# Patient Record
Sex: FEMALE | Race: WHITE | NOT HISPANIC OR LATINO | Employment: UNEMPLOYED | URBAN - METROPOLITAN AREA
[De-identification: names, ages, dates, MRNs, and addresses within clinical notes are randomized per-mention and may not be internally consistent; named-entity substitution may affect disease eponyms.]

---

## 2017-01-11 ENCOUNTER — OFFICE VISIT (OUTPATIENT)
Dept: INTERNAL MEDICINE | Facility: CLINIC | Age: 47
End: 2017-01-11

## 2017-01-11 VITALS
HEART RATE: 86 BPM | RESPIRATION RATE: 16 BRPM | BODY MASS INDEX: 24.53 KG/M2 | WEIGHT: 152 LBS | DIASTOLIC BLOOD PRESSURE: 72 MMHG | SYSTOLIC BLOOD PRESSURE: 100 MMHG

## 2017-01-11 DIAGNOSIS — F32.A DEPRESSION, UNSPECIFIED DEPRESSION TYPE: ICD-10-CM

## 2017-01-11 DIAGNOSIS — G43.909 MIGRAINE WITHOUT STATUS MIGRAINOSUS, NOT INTRACTABLE, UNSPECIFIED MIGRAINE TYPE: Primary | ICD-10-CM

## 2017-01-11 PROCEDURE — 99214 OFFICE O/P EST MOD 30 MIN: CPT | Performed by: INTERNAL MEDICINE

## 2017-01-11 RX ORDER — SERTRALINE HYDROCHLORIDE 100 MG/1
TABLET, FILM COATED ORAL
Qty: 30 TABLET | Refills: 11 | Status: SHIPPED | OUTPATIENT
Start: 2017-01-11 | End: 2018-01-25

## 2017-01-11 RX ORDER — TOPIRAMATE 50 MG/1
50 TABLET, FILM COATED ORAL 2 TIMES DAILY
Qty: 30 TABLET | Refills: 11 | Status: SHIPPED | OUTPATIENT
Start: 2017-01-11 | End: 2018-01-15

## 2017-01-11 RX ORDER — AMITRIPTYLINE HYDROCHLORIDE 25 MG/1
25 TABLET, FILM COATED ORAL NIGHTLY PRN
Qty: 30 TABLET | Refills: 11 | Status: SHIPPED | OUTPATIENT
Start: 2017-01-11 | End: 2018-01-15

## 2017-01-11 RX ORDER — SUMATRIPTAN 25 MG/1
TABLET, FILM COATED ORAL
Qty: 10 TABLET | Refills: 5 | Status: SHIPPED | OUTPATIENT
Start: 2017-01-11 | End: 2018-08-07

## 2017-01-11 NOTE — MR AVS SNAPSHOT
Aga Campos   1/11/2017 9:30 AM   Office Visit    Provider:  Courtney Nixon DO   Department:  [facility] INTERNAL MEDICINE AND PEDIATRICS   Dept Phone:  [phone]                Your Full Care Plan              Today's Medication Changes          These changes are accurate as of: 1/11/17 10:07 AM.  If you have any questions, ask your nurse or doctor.               Medication(s)that have changed:     amitriptyline 25 MG tablet   Commonly known as:  ELAVIL   Take 1 tablet by mouth At Night As Needed for sleep.   What changed:  See the new instructions.   Changed by:  Courtney Nixon DO       sertraline 100 MG tablet   Commonly known as:  ZOLOFT   Take 1 tablet PO daily.   What changed:  See the new instructions.   Changed by:  Courtney Nixon DO       SUMAtriptan 25 MG tablet   Commonly known as:  IMITREX   Take one tablet at onset of headache; may repeat one in 2 hours PRN mag of 200 mg a day   What changed:  See the new instructions.   Changed by:  Courtney Nixon DO       topiramate 50 MG tablet   Commonly known as:  TOPAMAX   Take 1 tablet by mouth 2 (Two) Times a Day.   What changed:  Another medication with the same name was removed. Continue taking this medication, and follow the directions you see here.   Changed by:  Courtney Nixon DO            Where to Get Your Medications      [facility] [facility] - [phone]  - [phone]   [address] [facility] [address]     Phone:  [phone]     amitriptyline 25 MG tablet    sertraline 100 MG tablet    SUMAtriptan 25 MG tablet    topiramate 50 MG tablet                  Your Updated Medication List          This list is accurate as of: 1/11/17 10:07 AM.  Always use your most recent med list.                amitriptyline 25 MG tablet   Commonly known as:  ELAVIL   Take 1  tablet by mouth At Night As Needed for sleep.       sertraline 100 MG tablet   Commonly known as:  ZOLOFT   Take 1 tablet PO daily.       SUMAtriptan 25 MG tablet   Commonly known as:  IMITREX   Take one tablet at onset of headache; may repeat one in 2 hours PRN mag of 200 mg a day       topiramate 50 MG tablet   Commonly known as:  TOPAMAX   Take 1 tablet by mouth 2 (Two) Times a Day.               You Were Diagnosed With        Codes Comments    Migraine without status migrainosus, not intractable, unspecified migraine type    -  Primary ICD-10-CM: G43.909  ICD-9-CM: 346.90     Depression, unspecified depression type     ICD-10-CM: F32.9  ICD-9-CM: 311       Instructions     None    Patient Instructions History      [employer] Signup     [facility] [email or web address] allows you to send messages to your doctor, view your test results, renew your prescriptions, schedule appointments, and more. To sign up, go to [employer] and click on the Sign Up Now link in the New User? box. Enter your [email or web address] Activation Code exactly as it appears below along with the last four digits of your Social Security Number and your Date of Birth () to complete the sign-up process. If you do not sign up before the expiration date, you must request a new code.    [email or web address] Activation Code: [email or web address]  Expires: 2017 10:06 AM    If you have questions, you can email [email or web address] or call [phone] to talk to our [email or web address] staff. Remember, [email or web address] is NOT to be used for urgent needs. For medical emergencies, dial 911.               Other Info from Your Visit           Your Appointments     2018  8:45 AM EST   Physical with Courtney Nixon,    [facility] INTERNAL MEDICINE AND PEDIATRICS (--)    [address] [address] [phone]           Arrive 15 minutes prior to appointment.              Allergies     Erythromycin        Reason for Visit      Migraine Follow up      Vital Signs     Blood Pressure Pulse Respirations Weight Body Mass Index Smoking Status    100/72 (BP Location: Left arm, Patient Position: Sitting) 86 16 152 lb (68.9 kg) 24.53 kg/m2 Never Smoker      Problems and Diagnoses Noted     Depression    Migraines      No Longer an Issue     Increased frequency of urination    Retention of urine

## 2017-01-11 NOTE — PROGRESS NOTES
Subjective   Aga Campos is a 46 y.o. female Pt is here to follow up on migraines and depression.        .     History of Present Illness   Pt is here to follow up on migraines and depression. She states both issues are doing well without any acute complaints.                 The following portions of the patient's history were reviewed and updated as appropriate: allergies, current medications, past family history, past medical history, past social history, past surgical history and problem list.             Review of Systems   Constitutional: Negative.    HENT: Negative.    Eyes: Negative.    Respiratory: Negative.    Cardiovascular: Negative.    Gastrointestinal: Negative.    Endocrine: Negative.    Genitourinary: Negative.    Musculoskeletal: Negative.    Skin: Negative.    Allergic/Immunologic: Negative.    Neurological:        See HPI.    Hematological: Negative.    Psychiatric/Behavioral:        See HPI.               Objective   Physical Exam   Constitutional: She is oriented to person, place, and time. She appears well-developed and well-nourished.   HENT:   Head: Normocephalic and atraumatic.   Right Ear: External ear normal.   Left Ear: External ear normal.   Nose: Nose normal.   Mouth/Throat: Oropharynx is clear and moist.   Eyes: Conjunctivae and EOM are normal. Pupils are equal, round, and reactive to light.   Neck: Normal range of motion. Neck supple. No tracheal deviation present. No thyromegaly present.   Cardiovascular: Normal rate, regular rhythm, normal heart sounds and intact distal pulses.    Pulmonary/Chest: Effort normal and breath sounds normal.   Abdominal: Soft. Bowel sounds are normal. She exhibits no distension. There is no tenderness.   Neurological: She is alert and oriented to person, place, and time. She has normal reflexes.   Skin: Skin is warm and dry.   Psychiatric: She has a normal mood and affect.   Nursing note and vitals reviewed.              Assessment/Plan   There are  no diagnoses linked to this encounter.  Aga was seen today for migraine.    Diagnoses and all orders for this visit:    Migraine without status migrainosus, not intractable, unspecified migraine type  -     amitriptyline (ELAVIL) 25 MG tablet; Take 1 tablet by mouth At Night As Needed for sleep.  -     topiramate (TOPAMAX) 50 MG tablet; Take 1 tablet by mouth 2 (Two) Times a Day.  -     SUMAtriptan (IMITREX) 25 MG tablet; Take one tablet at onset of headache; may repeat one in 2 hours PRN mag of 200 mg a day    Depression, unspecified depression type    Other orders  -     sertraline (ZOLOFT) 100 MG tablet; Take 1 tablet PO daily.          1.) Pt has had flu vaccine this year. December 2016.   2.) Follow up in 1 year or sooner if needed.  3.) Reviewed and refilled chronic meds if needed.      * Total time spent in discussion and exam 25 minutes.

## 2017-01-25 ENCOUNTER — TELEPHONE (OUTPATIENT)
Dept: INTERNAL MEDICINE | Facility: CLINIC | Age: 47
End: 2017-01-25

## 2017-01-25 RX ORDER — GUAIFENESIN, PSEUDOEPHEDRINE HYDROCHLORIDE 600; 60 MG/1; MG/1
1 TABLET, EXTENDED RELEASE ORAL EVERY 12 HOURS
Qty: 30 TABLET | Refills: 2 | Status: SHIPPED | OUTPATIENT
Start: 2017-01-25 | End: 2017-06-22 | Stop reason: SDUPTHER

## 2017-01-25 NOTE — TELEPHONE ENCOUNTER
----- Message from Yudy Gomez MA sent at 1/25/2017  2:25 PM EST -----  Spoke with Pt and she states she takes Mucinex-D daily and meant to get a rx for this. Pharmacy will only let her buy it otc for prn.     [phone]  Jose E

## 2017-06-26 RX ORDER — GUAIFENESIN AND PSEUDOEPHEDRINE HYDROCHLORIDE 600; 60 MG/1; MG/1
TABLET, EXTENDED RELEASE ORAL
Qty: 30 TABLET | Refills: 1 | Status: SHIPPED | OUTPATIENT
Start: 2017-06-26 | End: 2017-08-25 | Stop reason: SDUPTHER

## 2017-07-27 ENCOUNTER — TELEPHONE (OUTPATIENT)
Dept: INTERNAL MEDICINE | Facility: CLINIC | Age: 47
End: 2017-07-27

## 2017-08-25 RX ORDER — GUAIFENESIN, PSEUDOEPHEDRINE HYDROCHLORIDE 600; 60 MG/1; MG/1
1 TABLET, EXTENDED RELEASE ORAL EVERY 12 HOURS
Qty: 30 TABLET | Refills: 1 | Status: SHIPPED | OUTPATIENT
Start: 2017-08-25 | End: 2017-09-28 | Stop reason: SDUPTHER

## 2017-09-29 RX ORDER — GUAIFENESIN, PSEUDOEPHEDRINE HYDROCHLORIDE 600; 60 MG/1; MG/1
TABLET, EXTENDED RELEASE ORAL
Qty: 30 TABLET | Refills: 0 | Status: SHIPPED | OUTPATIENT
Start: 2017-09-29 | End: 2018-08-07 | Stop reason: SDUPTHER

## 2018-01-15 ENCOUNTER — OFFICE VISIT (OUTPATIENT)
Dept: INTERNAL MEDICINE | Facility: CLINIC | Age: 48
End: 2018-01-15

## 2018-01-15 VITALS
RESPIRATION RATE: 16 BRPM | OXYGEN SATURATION: 100 % | BODY MASS INDEX: 24.04 KG/M2 | HEIGHT: 66 IN | SYSTOLIC BLOOD PRESSURE: 128 MMHG | TEMPERATURE: 98.5 F | WEIGHT: 149.6 LBS | DIASTOLIC BLOOD PRESSURE: 80 MMHG | HEART RATE: 88 BPM

## 2018-01-15 DIAGNOSIS — N95.1 MENOPAUSE SYNDROME: Primary | ICD-10-CM

## 2018-01-15 DIAGNOSIS — F32.A DEPRESSION, UNSPECIFIED DEPRESSION TYPE: ICD-10-CM

## 2018-01-15 DIAGNOSIS — Z12.39 SCREENING FOR BREAST CANCER: ICD-10-CM

## 2018-01-15 LAB
ALBUMIN SERPL-MCNC: 4.4 G/DL (ref 3.2–4.8)
ALBUMIN/GLOB SERPL: 1.7 G/DL (ref 1.5–2.5)
ALP SERPL-CCNC: 68 U/L (ref 25–100)
ALT SERPL W P-5'-P-CCNC: 51 U/L (ref 7–40)
ANION GAP SERPL CALCULATED.3IONS-SCNC: 7 MMOL/L (ref 3–11)
AST SERPL-CCNC: 50 U/L (ref 0–33)
BASOPHILS # BLD AUTO: 0.02 10*3/MM3 (ref 0–0.2)
BASOPHILS NFR BLD AUTO: 0.3 % (ref 0–1)
BILIRUB SERPL-MCNC: 0.3 MG/DL (ref 0.3–1.2)
BUN BLD-MCNC: 10 MG/DL (ref 9–23)
BUN/CREAT SERPL: 14.3 (ref 7–25)
CALCIUM SPEC-SCNC: 9 MG/DL (ref 8.7–10.4)
CHLORIDE SERPL-SCNC: 109 MMOL/L (ref 99–109)
CO2 SERPL-SCNC: 25 MMOL/L (ref 20–31)
CREAT BLD-MCNC: 0.7 MG/DL (ref 0.6–1.3)
DEPRECATED RDW RBC AUTO: 45.2 FL (ref 37–54)
EOSINOPHIL # BLD AUTO: 0.13 10*3/MM3 (ref 0–0.3)
EOSINOPHIL NFR BLD AUTO: 2.1 % (ref 0–3)
ERYTHROCYTE [DISTWIDTH] IN BLOOD BY AUTOMATED COUNT: 13.5 % (ref 11.3–14.5)
ESTRADIOL SERPL HS-MCNC: 41 PG/ML
FSH SERPL-ACNC: 26.1 MIU/ML
GFR SERPL CREATININE-BSD FRML MDRD: 90 ML/MIN/1.73
GLOBULIN UR ELPH-MCNC: 2.6 GM/DL
GLUCOSE BLD-MCNC: 80 MG/DL (ref 70–100)
HCT VFR BLD AUTO: 40.1 % (ref 34.5–44)
HGB BLD-MCNC: 12.6 G/DL (ref 11.5–15.5)
IMM GRANULOCYTES # BLD: 0.02 10*3/MM3 (ref 0–0.03)
IMM GRANULOCYTES NFR BLD: 0.3 % (ref 0–0.6)
LH SERPL-ACNC: 11.7 MIU/ML
LYMPHOCYTES # BLD AUTO: 0.87 10*3/MM3 (ref 0.6–4.8)
LYMPHOCYTES NFR BLD AUTO: 14 % (ref 24–44)
MCH RBC QN AUTO: 28.9 PG (ref 27–31)
MCHC RBC AUTO-ENTMCNC: 31.4 G/DL (ref 32–36)
MCV RBC AUTO: 92 FL (ref 80–99)
MONOCYTES # BLD AUTO: 0.51 10*3/MM3 (ref 0–1)
MONOCYTES NFR BLD AUTO: 8.2 % (ref 0–12)
NEUTROPHILS # BLD AUTO: 4.67 10*3/MM3 (ref 1.5–8.3)
NEUTROPHILS NFR BLD AUTO: 75.1 % (ref 41–71)
PLATELET # BLD AUTO: 276 10*3/MM3 (ref 150–450)
PMV BLD AUTO: 10.2 FL (ref 6–12)
POTASSIUM BLD-SCNC: 4.8 MMOL/L (ref 3.5–5.5)
PROT SERPL-MCNC: 7 G/DL (ref 5.7–8.2)
RBC # BLD AUTO: 4.36 10*6/MM3 (ref 3.89–5.14)
SODIUM BLD-SCNC: 141 MMOL/L (ref 132–146)
TSH SERPL DL<=0.05 MIU/L-ACNC: 1.22 MIU/ML (ref 0.35–5.35)
WBC NRBC COR # BLD: 6.22 10*3/MM3 (ref 3.5–10.8)

## 2018-01-15 PROCEDURE — 83001 ASSAY OF GONADOTROPIN (FSH): CPT | Performed by: NURSE PRACTITIONER

## 2018-01-15 PROCEDURE — 82670 ASSAY OF TOTAL ESTRADIOL: CPT | Performed by: NURSE PRACTITIONER

## 2018-01-15 PROCEDURE — 80053 COMPREHEN METABOLIC PANEL: CPT | Performed by: NURSE PRACTITIONER

## 2018-01-15 PROCEDURE — 99203 OFFICE O/P NEW LOW 30 MIN: CPT | Performed by: NURSE PRACTITIONER

## 2018-01-15 PROCEDURE — 84443 ASSAY THYROID STIM HORMONE: CPT | Performed by: NURSE PRACTITIONER

## 2018-01-15 PROCEDURE — 85025 COMPLETE CBC W/AUTO DIFF WBC: CPT | Performed by: NURSE PRACTITIONER

## 2018-01-15 PROCEDURE — 83002 ASSAY OF GONADOTROPIN (LH): CPT | Performed by: NURSE PRACTITIONER

## 2018-01-15 NOTE — PROGRESS NOTES
Subjective   Aga Campos is a 47 y.o. female    Chief Complaint   Patient presents with   • Establish Care   • Head cold     nasal/head congestion, drainage and runny nose   • Would like Hormones     History of Present Illness     New pt here to establish care    URI sx's x 1 week.  Has been taking OTC meds and sx's are improving. No fever or chills.  No cough or chest congestion.    Menopause sx's - suffering from hot flashes, not sleeping well, mood swings.  Feels that her hormones are off.  States that her mother went through menopause early and sx's were similar.  She is currently taking Zoloft for anxiety/depression and has been on this since her daughter was born 3 years ago.  States that it does help some, but would like to get off this if her hormones show menopause.      Past Medical History:   Diagnosis Date   • History of Papanicolaou smear of cervix 01/15/2017     Past Surgical History:   Procedure Laterality Date   •  SECTION  2014     Allergies   Allergen Reactions   • Erythromycin      Family History   Problem Relation Age of Onset   • Lung disease Mother    • Other Father      pacemaker   • No Known Problems Sister    • Alcohol abuse Brother    • Cancer Maternal Grandmother    • No Known Problems Son    • No Known Problems Son    • No Known Problems Daughter    • No Known Problems Daughter      Social History     Social History   • Marital status:      Spouse name: N/A   • Number of children: N/A   • Years of education: N/A     Occupational History   • Not on file.     Social History Main Topics   • Smoking status: Former Smoker   • Smokeless tobacco: Never Used   • Alcohol use Yes      Comment: on occasion   • Drug use: No   • Sexual activity: Not on file      Comment:      Other Topics Concern   • Not on file     Social History Narrative   • No narrative on file         The following portions of the patient's history were reviewed and updated as appropriate:  allergies, current medications, past family history, past medical history, past social history, past surgical history and problem list.    Current Outpatient Prescriptions:   •  pseudoephedrine-guaifenesin (MUCINEX D)  MG per 12 hr tablet, TAKE ONE TABLET BY MOUTH EVERY 12 HOURS **MUST CALL MD FOR APPOINTMENT, Disp: 30 tablet, Rfl: 0  •  sertraline (ZOLOFT) 100 MG tablet, Take 1 tablet PO daily., Disp: 30 tablet, Rfl: 11  •  SUMAtriptan (IMITREX) 25 MG tablet, Take one tablet at onset of headache; may repeat one in 2 hours PRN mag of 200 mg a day, Disp: 10 tablet, Rfl: 5     Review of Systems   Constitutional: Negative for chills, fatigue and fever.   Respiratory: Negative for cough, chest tightness and shortness of breath.    Cardiovascular: Negative for chest pain.   Gastrointestinal: Negative for abdominal pain, diarrhea, nausea and vomiting.   Endocrine: Negative for cold intolerance and heat intolerance.   Musculoskeletal: Negative for arthralgias.   Neurological: Negative for dizziness.   Psychiatric/Behavioral: Positive for agitation and sleep disturbance. Negative for suicidal ideas. The patient is not nervous/anxious.        Objective   Physical Exam   Constitutional: She is oriented to person, place, and time. She appears well-developed and well-nourished.   HENT:   Head: Normocephalic and atraumatic.   Eyes: Conjunctivae and EOM are normal. Pupils are equal, round, and reactive to light.   Neck: Normal range of motion.   Cardiovascular: Normal rate, regular rhythm and normal heart sounds.    Pulmonary/Chest: Effort normal and breath sounds normal.   Abdominal: Soft. Bowel sounds are normal.   Musculoskeletal: Normal range of motion.   Neurological: She is alert and oriented to person, place, and time. She has normal reflexes.   Skin: Skin is warm and dry.   Psychiatric: She has a normal mood and affect. Her behavior is normal. Judgment and thought content normal.     Vitals:    01/15/18 1005   BP:  "128/80   Pulse: 88   Resp: 16   Temp: 98.5 °F (36.9 °C)   TempSrc: Temporal Artery    SpO2: 100%   Weight: 67.9 kg (149 lb 9.6 oz)   Height: 167.6 cm (66\")         Assessment/Plan   Aga was seen today for establish care, head cold and would like hormones.    Diagnoses and all orders for this visit:    Menopause syndrome  -     CBC & Differential  -     Comprehensive Metabolic Panel  -     Estradiol  -     Follicle Stimulating Hormone  -     Luteinizing Hormone  -     TSH  -     CBC Auto Differential    Depression, unspecified depression type  -     CBC & Differential  -     Comprehensive Metabolic Panel  -     Estradiol  -     Follicle Stimulating Hormone  -     Luteinizing Hormone  -     TSH  -     CBC Auto Differential    Screening for breast cancer  -     Mammo Screening Digital Tomosynthesis Bilateral With CAD    We will start with labs today  I have also ordered a mammogram  If labs indicated menopause, I will start HRT after the mammogram is complete  F/U soon for PE             "

## 2018-01-24 ENCOUNTER — TELEPHONE (OUTPATIENT)
Dept: INTERNAL MEDICINE | Facility: CLINIC | Age: 48
End: 2018-01-24

## 2018-01-24 NOTE — TELEPHONE ENCOUNTER
Patient has been notified! She said she would like to try the Effexor! I told her you would be able to get that sent in for her tomorrow! She was fine with that!

## 2018-01-24 NOTE — TELEPHONE ENCOUNTER
----- Message from NEELA Cummins sent at 1/24/2018 10:12 AM EST -----  Hormone labs do not indicated menopause just yet.  Would she like to try a different antidepressant.  I would suggest Effexor.

## 2018-01-25 RX ORDER — VENLAFAXINE HYDROCHLORIDE 75 MG/1
75 CAPSULE, EXTENDED RELEASE ORAL DAILY
Qty: 30 CAPSULE | Refills: 5 | Status: SHIPPED | OUTPATIENT
Start: 2018-01-25 | End: 2018-07-27 | Stop reason: SDUPTHER

## 2018-01-25 RX ORDER — SERTRALINE HYDROCHLORIDE 100 MG/1
TABLET, FILM COATED ORAL
Qty: 30 TABLET | Refills: 0 | Status: SHIPPED | OUTPATIENT
Start: 2018-01-25 | End: 2018-08-07

## 2018-01-25 NOTE — TELEPHONE ENCOUNTER
Patient notified. She just took her last Zoloft and wanted to know if you could send her in 1 weeks worth of the Zoloft so she can alternate between the two?

## 2018-01-25 NOTE — TELEPHONE ENCOUNTER
I have sent in Effexor 75mg.  I want her to alternate with the Zoloft 100 and Effexor 75 every other day for 1 week, then take only the Effexor daily.  F/U with me in 4-6 weeks so we can see how she is feeling on this.

## 2018-02-14 ENCOUNTER — HOSPITAL ENCOUNTER (OUTPATIENT)
Dept: MAMMOGRAPHY | Facility: HOSPITAL | Age: 48
Discharge: HOME | End: 2018-02-14
Admitting: NURSE PRACTITIONER

## 2018-02-14 PROCEDURE — 77063 BREAST TOMOSYNTHESIS BI: CPT

## 2018-02-14 PROCEDURE — 77067 SCR MAMMO BI INCL CAD: CPT | Performed by: RADIOLOGY

## 2018-02-14 PROCEDURE — 77063 BREAST TOMOSYNTHESIS BI: CPT | Performed by: RADIOLOGY

## 2018-02-14 PROCEDURE — 77067 SCR MAMMO BI INCL CAD: CPT

## 2018-07-27 ENCOUNTER — TELEPHONE (OUTPATIENT)
Dept: INTERNAL MEDICINE | Facility: CLINIC | Age: 48
End: 2018-07-27

## 2018-07-27 RX ORDER — VENLAFAXINE HYDROCHLORIDE 75 MG/1
75 CAPSULE, EXTENDED RELEASE ORAL DAILY
Qty: 30 CAPSULE | Refills: 0 | Status: SHIPPED | OUTPATIENT
Start: 2018-07-27 | End: 2018-08-07 | Stop reason: SDUPTHER

## 2018-07-27 NOTE — TELEPHONE ENCOUNTER
Patient has apt on 8/7 and needs refill on oxyCODONE-acetaminophen (PERCOCET)  MG per tablet this is an error. Patient does not take this RX this was entered incorrectly. Patient needs refill on venlafaxine XR (EFFEXOR XR) 75 MG 24 hr capsule

## 2018-08-01 RX ORDER — VENLAFAXINE HYDROCHLORIDE 75 MG/1
CAPSULE, EXTENDED RELEASE ORAL
Qty: 30 CAPSULE | Refills: 4 | OUTPATIENT
Start: 2018-08-01

## 2018-08-07 ENCOUNTER — OFFICE VISIT (OUTPATIENT)
Dept: INTERNAL MEDICINE | Facility: CLINIC | Age: 48
End: 2018-08-07

## 2018-08-07 VITALS
WEIGHT: 151 LBS | TEMPERATURE: 97.8 F | OXYGEN SATURATION: 98 % | RESPIRATION RATE: 16 BRPM | HEART RATE: 100 BPM | DIASTOLIC BLOOD PRESSURE: 80 MMHG | HEIGHT: 66 IN | SYSTOLIC BLOOD PRESSURE: 118 MMHG | BODY MASS INDEX: 24.27 KG/M2

## 2018-08-07 DIAGNOSIS — F32.A DEPRESSION, UNSPECIFIED DEPRESSION TYPE: Primary | ICD-10-CM

## 2018-08-07 DIAGNOSIS — N92.6 IRREGULAR MENSTRUATION: ICD-10-CM

## 2018-08-07 LAB
25(OH)D3 SERPL-MCNC: 25.4 NG/ML
ALBUMIN SERPL-MCNC: 4.32 G/DL (ref 3.2–4.8)
ALBUMIN/GLOB SERPL: 1.7 G/DL (ref 1.5–2.5)
ALP SERPL-CCNC: 63 U/L (ref 25–100)
ALT SERPL W P-5'-P-CCNC: 20 U/L (ref 7–40)
ANION GAP SERPL CALCULATED.3IONS-SCNC: 6 MMOL/L (ref 3–11)
AST SERPL-CCNC: 21 U/L (ref 0–33)
BASOPHILS # BLD AUTO: 0.02 10*3/MM3 (ref 0–0.2)
BASOPHILS NFR BLD AUTO: 0.5 % (ref 0–1)
BILIRUB SERPL-MCNC: 0.3 MG/DL (ref 0.3–1.2)
BUN BLD-MCNC: 10 MG/DL (ref 9–23)
BUN/CREAT SERPL: 13.2 (ref 7–25)
CALCIUM SPEC-SCNC: 9 MG/DL (ref 8.7–10.4)
CHLORIDE SERPL-SCNC: 108 MMOL/L (ref 99–109)
CO2 SERPL-SCNC: 26 MMOL/L (ref 20–31)
CREAT BLD-MCNC: 0.76 MG/DL (ref 0.6–1.3)
DEPRECATED RDW RBC AUTO: 43.8 FL (ref 37–54)
EOSINOPHIL # BLD AUTO: 0.14 10*3/MM3 (ref 0–0.3)
EOSINOPHIL NFR BLD AUTO: 3.2 % (ref 0–3)
ERYTHROCYTE [DISTWIDTH] IN BLOOD BY AUTOMATED COUNT: 13.4 % (ref 11.3–14.5)
ESTRADIOL SERPL HS-MCNC: 89 PG/ML
FSH SERPL-ACNC: 20.2 MIU/ML
GFR SERPL CREATININE-BSD FRML MDRD: 81 ML/MIN/1.73
GLOBULIN UR ELPH-MCNC: 2.6 GM/DL
GLUCOSE BLD-MCNC: 94 MG/DL (ref 70–100)
HCT VFR BLD AUTO: 39.7 % (ref 34.5–44)
HGB BLD-MCNC: 12.8 G/DL (ref 11.5–15.5)
IMM GRANULOCYTES # BLD: 0 10*3/MM3 (ref 0–0.03)
IMM GRANULOCYTES NFR BLD: 0 % (ref 0–0.6)
LH SERPL-ACNC: 9.4 MIU/ML
LYMPHOCYTES # BLD AUTO: 1.03 10*3/MM3 (ref 0.6–4.8)
LYMPHOCYTES NFR BLD AUTO: 23.9 % (ref 24–44)
MCH RBC QN AUTO: 29.2 PG (ref 27–31)
MCHC RBC AUTO-ENTMCNC: 32.2 G/DL (ref 32–36)
MCV RBC AUTO: 90.6 FL (ref 80–99)
MONOCYTES # BLD AUTO: 0.31 10*3/MM3 (ref 0–1)
MONOCYTES NFR BLD AUTO: 7.2 % (ref 0–12)
NEUTROPHILS # BLD AUTO: 2.81 10*3/MM3 (ref 1.5–8.3)
NEUTROPHILS NFR BLD AUTO: 65.2 % (ref 41–71)
PLATELET # BLD AUTO: 307 10*3/MM3 (ref 150–450)
PMV BLD AUTO: 10 FL (ref 6–12)
POTASSIUM BLD-SCNC: 4 MMOL/L (ref 3.5–5.5)
PROT SERPL-MCNC: 6.9 G/DL (ref 5.7–8.2)
RBC # BLD AUTO: 4.38 10*6/MM3 (ref 3.89–5.14)
SODIUM BLD-SCNC: 140 MMOL/L (ref 132–146)
TSH SERPL DL<=0.05 MIU/L-ACNC: 0.74 MIU/ML (ref 0.35–5.35)
VIT B12 BLD-MCNC: 435 PG/ML (ref 211–911)
WBC NRBC COR # BLD: 4.31 10*3/MM3 (ref 3.5–10.8)

## 2018-08-07 PROCEDURE — 99214 OFFICE O/P EST MOD 30 MIN: CPT | Performed by: NURSE PRACTITIONER

## 2018-08-07 PROCEDURE — 84443 ASSAY THYROID STIM HORMONE: CPT | Performed by: NURSE PRACTITIONER

## 2018-08-07 PROCEDURE — 85025 COMPLETE CBC W/AUTO DIFF WBC: CPT | Performed by: NURSE PRACTITIONER

## 2018-08-07 PROCEDURE — 82670 ASSAY OF TOTAL ESTRADIOL: CPT | Performed by: NURSE PRACTITIONER

## 2018-08-07 PROCEDURE — 82306 VITAMIN D 25 HYDROXY: CPT | Performed by: NURSE PRACTITIONER

## 2018-08-07 PROCEDURE — 83002 ASSAY OF GONADOTROPIN (LH): CPT | Performed by: NURSE PRACTITIONER

## 2018-08-07 PROCEDURE — 82607 VITAMIN B-12: CPT | Performed by: NURSE PRACTITIONER

## 2018-08-07 PROCEDURE — 83001 ASSAY OF GONADOTROPIN (FSH): CPT | Performed by: NURSE PRACTITIONER

## 2018-08-07 PROCEDURE — 80053 COMPREHEN METABOLIC PANEL: CPT | Performed by: NURSE PRACTITIONER

## 2018-08-07 RX ORDER — VENLAFAXINE HYDROCHLORIDE 75 MG/1
75 CAPSULE, EXTENDED RELEASE ORAL DAILY
Qty: 30 CAPSULE | Refills: 5 | Status: SHIPPED | OUTPATIENT
Start: 2018-08-07 | End: 2018-08-07

## 2018-08-07 RX ORDER — VENLAFAXINE HYDROCHLORIDE 150 MG/1
150 CAPSULE, EXTENDED RELEASE ORAL DAILY
Qty: 30 CAPSULE | Refills: 5 | Status: SHIPPED | OUTPATIENT
Start: 2018-08-07 | End: 2019-02-12

## 2018-08-07 RX ORDER — GUAIFENESIN, PSEUDOEPHEDRINE HYDROCHLORIDE 600; 60 MG/1; MG/1
1 TABLET, EXTENDED RELEASE ORAL EVERY 12 HOURS
Qty: 30 TABLET | Refills: 5 | Status: SHIPPED | OUTPATIENT
Start: 2018-08-07

## 2018-08-07 NOTE — PROGRESS NOTES
Subjective   Aga Campos is a 48 y.o. female    Chief Complaint   Patient presents with   • Follow-up   • Depression   • Med Refill     History of Present Illness     Here for f/u on anxiety/depression    Pt was changed from Zoloft to Effexor in January.  States that overall her mood is somewhat better, but she is still having a great deal of mood swings and irritability around her cycle.  States that periods are very irregular.  Last cycle was 48 days apart.  She was miserable and states that she spent a great deal of time in the bed due to her mood.  Her family is upset with her and her mood.        The following portions of the patient's history were reviewed and updated as appropriate: allergies, current medications, past family history, past medical history, past social history, past surgical history and problem list.    Current Outpatient Prescriptions:   •  pseudoephedrine-guaifenesin (MUCINEX D)  MG per 12 hr tablet, Take 1 tablet by mouth Every 12 (Twelve) Hours., Disp: 30 tablet, Rfl: 5  •  venlafaxine XR (EFFEXOR-XR) 150 MG 24 hr capsule, Take 1 capsule by mouth Daily., Disp: 30 capsule, Rfl: 5     Review of Systems   Constitutional: Negative for chills, fatigue and fever.   Respiratory: Negative for cough, chest tightness and shortness of breath.    Cardiovascular: Negative for chest pain.   Gastrointestinal: Negative for abdominal pain, diarrhea, nausea and vomiting.   Endocrine: Negative for cold intolerance and heat intolerance.   Musculoskeletal: Negative for arthralgias.   Neurological: Negative for dizziness.   Psychiatric/Behavioral: Positive for agitation. The patient is nervous/anxious.         Irritability       Objective   Physical Exam   Constitutional: She is oriented to person, place, and time. She appears well-developed and well-nourished.   HENT:   Head: Normocephalic and atraumatic.   Eyes: Pupils are equal, round, and reactive to light. Conjunctivae and EOM are normal.  "  Neck: Normal range of motion.   Cardiovascular: Normal rate, regular rhythm and normal heart sounds.    Pulmonary/Chest: Effort normal and breath sounds normal.   Abdominal: Soft. Bowel sounds are normal.   Musculoskeletal: Normal range of motion.   Neurological: She is alert and oriented to person, place, and time. She has normal reflexes.   Skin: Skin is warm and dry.   Psychiatric: Her behavior is normal. Judgment and thought content normal. Her mood appears anxious.     Vitals:    08/07/18 1057   BP: 118/80   Pulse: 100   Resp: 16   Temp: 97.8 °F (36.6 °C)   TempSrc: Temporal Artery    SpO2: 98%   Weight: 68.5 kg (151 lb)   Height: 167.6 cm (65.98\")         Assessment/Plan   Aga was seen today for follow-up, depression and med refill.    Diagnoses and all orders for this visit:    Depression, unspecified depression type  -     CBC & Differential  -     Comprehensive Metabolic Panel  -     Vitamin D 25 Hydroxy  -     Vitamin B12  -     TSH  -     Estradiol  -     Follicle Stimulating Hormone  -     Luteinizing Hormone  -     venlafaxine XR (EFFEXOR-XR) 150 MG 24 hr capsule; Take 1 capsule by mouth Daily.  -     CBC Auto Differential    Irregular menstruation  -     US Non-ob Transvaginal; Future  -     CBC & Differential  -     Comprehensive Metabolic Panel  -     Vitamin D 25 Hydroxy  -     Vitamin B12  -     TSH  -     Estradiol  -     Follicle Stimulating Hormone  -     Luteinizing Hormone  -     CBC Auto Differential    Other orders  -     pseudoephedrine-guaifenesin (MUCINEX D)  MG per 12 hr tablet; Take 1 tablet by mouth Every 12 (Twelve) Hours.  -     Discontinue: venlafaxine XR (EFFEXOR XR) 75 MG 24 hr capsule; Take 1 capsule by mouth Daily.      We will ck labs and a TV US  Will will increase her Effexor to 150mg daily  May need to see GYN  Return in about 6 months (around 2/7/2019) for Annual. or sooner if sx's worsen/do not improve.               "

## 2018-08-20 ENCOUNTER — TELEPHONE (OUTPATIENT)
Dept: INTERNAL MEDICINE | Facility: CLINIC | Age: 48
End: 2018-08-20

## 2018-08-20 NOTE — TELEPHONE ENCOUNTER
----- Message from NEELA Cummins sent at 8/20/2018 10:44 AM EDT -----  Please let pt know that her vitamin D is slightly low.  I recommend OTC D3 2000 units daily.      Hormone levels show that she is not menopausal at this point. We will see what her Transvaginal ultrasound shows and go from there.    All other labs looked good!

## 2018-08-21 ENCOUNTER — APPOINTMENT (OUTPATIENT)
Dept: ULTRASOUND IMAGING | Facility: HOSPITAL | Age: 48
End: 2018-08-21

## 2019-02-12 ENCOUNTER — OFFICE VISIT (OUTPATIENT)
Dept: INTERNAL MEDICINE | Facility: CLINIC | Age: 49
End: 2019-02-12

## 2019-02-12 VITALS
RESPIRATION RATE: 16 BRPM | SYSTOLIC BLOOD PRESSURE: 132 MMHG | HEIGHT: 66 IN | TEMPERATURE: 97.6 F | HEART RATE: 100 BPM | DIASTOLIC BLOOD PRESSURE: 88 MMHG | OXYGEN SATURATION: 99 % | WEIGHT: 156.4 LBS | BODY MASS INDEX: 25.13 KG/M2

## 2019-02-12 DIAGNOSIS — Z00.00 ANNUAL PHYSICAL EXAM: Primary | ICD-10-CM

## 2019-02-12 DIAGNOSIS — M21.942 ACQUIRED DEFORMITY OF LEFT HAND: ICD-10-CM

## 2019-02-12 DIAGNOSIS — F32.A DEPRESSION, UNSPECIFIED DEPRESSION TYPE: ICD-10-CM

## 2019-02-12 DIAGNOSIS — N95.1 MENOPAUSAL SYMPTOM: ICD-10-CM

## 2019-02-12 LAB
25(OH)D3 SERPL-MCNC: 38.4 NG/ML
ALBUMIN SERPL-MCNC: 4.43 G/DL (ref 3.2–4.8)
ALBUMIN/GLOB SERPL: 2.4 G/DL (ref 1.5–2.5)
ALP SERPL-CCNC: 71 U/L (ref 25–100)
ALT SERPL W P-5'-P-CCNC: 29 U/L (ref 7–40)
ANION GAP SERPL CALCULATED.3IONS-SCNC: 5 MMOL/L (ref 3–11)
ARTICHOKE IGE QN: 146 MG/DL (ref 0–130)
AST SERPL-CCNC: 34 U/L (ref 0–33)
BASOPHILS # BLD AUTO: 0.02 10*3/MM3 (ref 0–0.2)
BASOPHILS NFR BLD AUTO: 0.3 % (ref 0–1)
BILIRUB BLD-MCNC: NEGATIVE MG/DL
BILIRUB SERPL-MCNC: 0.5 MG/DL (ref 0.3–1.2)
BUN BLD-MCNC: 7 MG/DL (ref 9–23)
BUN/CREAT SERPL: 9 (ref 7–25)
CALCIUM SPEC-SCNC: 9.6 MG/DL (ref 8.7–10.4)
CHLORIDE SERPL-SCNC: 107 MMOL/L (ref 99–109)
CHOLEST SERPL-MCNC: 226 MG/DL (ref 0–200)
CLARITY, POC: CLEAR
CO2 SERPL-SCNC: 24 MMOL/L (ref 20–31)
COLOR UR: YELLOW
CREAT BLD-MCNC: 0.78 MG/DL (ref 0.6–1.3)
DEPRECATED RDW RBC AUTO: 44.8 FL (ref 37–54)
EOSINOPHIL # BLD AUTO: 0.1 10*3/MM3 (ref 0–0.3)
EOSINOPHIL NFR BLD AUTO: 1.3 % (ref 0–3)
ERYTHROCYTE [DISTWIDTH] IN BLOOD BY AUTOMATED COUNT: 13.2 % (ref 11.3–14.5)
ESTRADIOL SERPL HS-MCNC: 418 PG/ML
FOLATE SERPL-MCNC: 7.38 NG/ML (ref 3.2–20)
GFR SERPL CREATININE-BSD FRML MDRD: 79 ML/MIN/1.73
GLOBULIN UR ELPH-MCNC: 1.9 GM/DL
GLUCOSE BLD-MCNC: 73 MG/DL (ref 70–100)
GLUCOSE UR STRIP-MCNC: NEGATIVE MG/DL
HCT VFR BLD AUTO: 42.4 % (ref 34.5–44)
HDLC SERPL-MCNC: 74 MG/DL (ref 40–60)
HGB BLD-MCNC: 13.4 G/DL (ref 11.5–15.5)
IMM GRANULOCYTES # BLD AUTO: 0.02 10*3/MM3 (ref 0–0.05)
IMM GRANULOCYTES NFR BLD AUTO: 0.3 % (ref 0–0.6)
KETONES UR QL: NEGATIVE
LEUKOCYTE EST, POC: NEGATIVE
LH SERPL-ACNC: 7.6 MIU/ML
LYMPHOCYTES # BLD AUTO: 1.13 10*3/MM3 (ref 0.6–4.8)
LYMPHOCYTES NFR BLD AUTO: 15.1 % (ref 24–44)
MCH RBC QN AUTO: 29.2 PG (ref 27–31)
MCHC RBC AUTO-ENTMCNC: 31.6 G/DL (ref 32–36)
MCV RBC AUTO: 92.4 FL (ref 80–99)
MONOCYTES # BLD AUTO: 0.55 10*3/MM3 (ref 0–1)
MONOCYTES NFR BLD AUTO: 7.4 % (ref 0–12)
NEUTROPHILS # BLD AUTO: 5.64 10*3/MM3 (ref 1.5–8.3)
NEUTROPHILS NFR BLD AUTO: 75.6 % (ref 41–71)
NITRITE UR-MCNC: NEGATIVE MG/ML
PH UR: 8 [PH] (ref 5–8)
PLATELET # BLD AUTO: 289 10*3/MM3 (ref 150–450)
PMV BLD AUTO: 9.6 FL (ref 6–12)
POTASSIUM BLD-SCNC: 4.7 MMOL/L (ref 3.5–5.5)
PROT SERPL-MCNC: 6.3 G/DL (ref 5.7–8.2)
PROT UR STRIP-MCNC: NEGATIVE MG/DL
RBC # BLD AUTO: 4.59 10*6/MM3 (ref 3.89–5.14)
RBC # UR STRIP: ABNORMAL /UL
SODIUM BLD-SCNC: 136 MMOL/L (ref 132–146)
SP GR UR: 1.01 (ref 1–1.03)
TRIGL SERPL-MCNC: 83 MG/DL (ref 0–150)
TSH SERPL DL<=0.05 MIU/L-ACNC: 1.61 MIU/ML (ref 0.35–5.35)
UROBILINOGEN UR QL: NORMAL
VIT B12 BLD-MCNC: 390 PG/ML (ref 211–911)
WBC NRBC COR # BLD: 7.46 10*3/MM3 (ref 3.5–10.8)

## 2019-02-12 PROCEDURE — 85025 COMPLETE CBC W/AUTO DIFF WBC: CPT | Performed by: NURSE PRACTITIONER

## 2019-02-12 PROCEDURE — 99396 PREV VISIT EST AGE 40-64: CPT | Performed by: NURSE PRACTITIONER

## 2019-02-12 PROCEDURE — 82746 ASSAY OF FOLIC ACID SERUM: CPT | Performed by: NURSE PRACTITIONER

## 2019-02-12 PROCEDURE — 81003 URINALYSIS AUTO W/O SCOPE: CPT | Performed by: NURSE PRACTITIONER

## 2019-02-12 PROCEDURE — 82607 VITAMIN B-12: CPT | Performed by: NURSE PRACTITIONER

## 2019-02-12 PROCEDURE — 82670 ASSAY OF TOTAL ESTRADIOL: CPT | Performed by: NURSE PRACTITIONER

## 2019-02-12 PROCEDURE — 83002 ASSAY OF GONADOTROPIN (LH): CPT | Performed by: NURSE PRACTITIONER

## 2019-02-12 PROCEDURE — 84443 ASSAY THYROID STIM HORMONE: CPT | Performed by: NURSE PRACTITIONER

## 2019-02-12 PROCEDURE — 80053 COMPREHEN METABOLIC PANEL: CPT | Performed by: NURSE PRACTITIONER

## 2019-02-12 PROCEDURE — 82306 VITAMIN D 25 HYDROXY: CPT | Performed by: NURSE PRACTITIONER

## 2019-02-12 PROCEDURE — 80061 LIPID PANEL: CPT | Performed by: NURSE PRACTITIONER

## 2019-02-12 RX ORDER — VILAZODONE HYDROCHLORIDE 10 MG/1
10 TABLET ORAL DAILY
Qty: 60 TABLET | Refills: 1 | Status: SHIPPED | OUTPATIENT
Start: 2019-02-12 | End: 2019-02-14

## 2019-02-12 RX ORDER — VENLAFAXINE HYDROCHLORIDE 75 MG/1
75 CAPSULE, EXTENDED RELEASE ORAL DAILY
Qty: 30 CAPSULE | Refills: 0 | Status: SHIPPED | OUTPATIENT
Start: 2019-02-12 | End: 2019-03-26

## 2019-02-12 NOTE — PROGRESS NOTES
Subjective   Aga Campos is a 48 y.o. female    Chief Complaint   Patient presents with   • Annual Exam     History of Present Illness     Here for PE    Anxiety/PMS - Pt was changed from Zoloft to Effexor in January of 2018 and increased to 150mg in August.   States that she is still battling anxiety and irritability.  She is interested in trying Viibryd.  She was on this several years ago and felt great, but had to stop due to insurance.  Denies S/H ideations.      C/O a large knot/deformit on the top of left hand that has been there for many years.  Has never had this evaluated.  She denies pain or trauma.  This does not limit her ROM or activity.       Flu shot - declines  Tdap - 2015  Hep A - declines  Pap - 2017  Mamm - -2/14/18    Diet - healthy/balanced    Exercise - moderate    Social History     Tobacco Use   • Smoking status: Former Smoker   • Smokeless tobacco: Never Used   Substance Use Topics   • Alcohol use: Yes     Comment: on occasion   • Drug use: No         The following portions of the patient's history were reviewed and updated as appropriate: allergies, current medications, past family history, past medical history, past social history, past surgical history and problem list.    Current Outpatient Medications:   •  pseudoephedrine-guaifenesin (MUCINEX D)  MG per 12 hr tablet, Take 1 tablet by mouth Every 12 (Twelve) Hours., Disp: 30 tablet, Rfl: 5  •  venlafaxine XR (EFFEXOR XR) 75 MG 24 hr capsule, Take 1 capsule by mouth Daily., Disp: 30 capsule, Rfl: 0  •  vilazodone (VIIBRYD) 10 MG tablet tablet, Take 1 tablet by mouth Daily. 1 PO daily x 1 week, then increase to 2 PO daily, Disp: 60 tablet, Rfl: 1     Review of Systems   Constitutional: Negative for appetite change, chills, fatigue, fever and unexpected weight change.   HENT: Negative for congestion, ear pain, nosebleeds, rhinorrhea and tinnitus.    Eyes: Negative for pain.   Respiratory: Negative for cough, chest tightness and  shortness of breath.    Cardiovascular: Negative for chest pain, palpitations and leg swelling.   Gastrointestinal: Negative for abdominal distention, abdominal pain, blood in stool, constipation, diarrhea, nausea and vomiting.   Endocrine: Negative for cold intolerance, heat intolerance, polydipsia, polyphagia and polyuria.   Genitourinary: Negative for dysuria, flank pain, frequency, hematuria and urgency.   Musculoskeletal: Negative for arthralgias, back pain, gait problem, joint swelling, myalgias and neck pain.        Left hand deformity   Skin: Negative for color change, pallor, rash and wound.   Allergic/Immunologic: Negative for environmental allergies and food allergies.   Neurological: Negative for dizziness, syncope, weakness, light-headedness, numbness and headaches.   Hematological: Negative for adenopathy. Does not bruise/bleed easily.   Psychiatric/Behavioral: Negative for behavioral problems and suicidal ideas. The patient is not nervous/anxious.        Objective   Physical Exam   Constitutional: She is oriented to person, place, and time. She appears well-developed and well-nourished.   HENT:   Head: Normocephalic and atraumatic.   Right Ear: External ear normal.   Left Ear: External ear normal.   Nose: Nose normal.   Mouth/Throat: Oropharynx is clear and moist.   Eyes: Conjunctivae and EOM are normal. Pupils are equal, round, and reactive to light.   Neck: Normal range of motion. Neck supple.   Cardiovascular: Normal rate, regular rhythm, normal heart sounds and intact distal pulses.   Pulses:       Carotid pulses are 2+ on the right side, and 2+ on the left side.  Pulmonary/Chest: Effort normal and breath sounds normal. Right breast exhibits no inverted nipple, no mass, no nipple discharge, no skin change and no tenderness. Left breast exhibits no inverted nipple, no mass, no nipple discharge, no skin change and no tenderness. Breasts are symmetrical. There is no breast swelling.   Abdominal:  "Soft. Normal appearance, normal aorta and bowel sounds are normal.   Genitourinary: No breast tenderness, discharge or bleeding.   Musculoskeletal: Normal range of motion.        Left hand: She exhibits deformity (large, firm knot on the dorsal surface; non-tender; non-mobile) and swelling. She exhibits normal range of motion, no tenderness, no bony tenderness, normal two-point discrimination, normal capillary refill and no laceration. Normal sensation noted. Decreased sensation is not present in the ulnar distribution, is not present in the medial redistribution and is not present in the radial distribution. Normal strength noted. She exhibits no finger abduction, no thumb/finger opposition and no wrist extension trouble.   Lymphadenopathy:        Head (right side): No tonsillar adenopathy present.        Head (left side): No tonsillar adenopathy present.        Right cervical: No superficial cervical and no posterior cervical adenopathy present.       Left cervical: No superficial cervical and no posterior cervical adenopathy present.   Neurological: She is alert and oriented to person, place, and time. She has normal strength and normal reflexes. She displays a negative Romberg sign.   Skin: Skin is warm, dry and intact.   Psychiatric: She has a normal mood and affect. Her behavior is normal. Judgment and thought content normal.   Vitals reviewed.    Vitals:    02/12/19 0905   BP: 132/88   Pulse: 100   Resp: 16   Temp: 97.6 °F (36.4 °C)   TempSrc: Temporal   SpO2: 99%   Weight: 70.9 kg (156 lb 6.4 oz)   Height: 167.6 cm (65.98\")         Assessment/Plan   Aga was seen today for annual exam.    Diagnoses and all orders for this visit:    Annual physical exam  -     POC Urinalysis Dipstick, Automated  -     CBC & Differential  -     Comprehensive Metabolic Panel  -     Lipid Panel  -     Vitamin D 25 Hydroxy  -     Vitamin B12  -     TSH  -     Estradiol  -     Folate  -     Luteinizing Hormone    Depression, " unspecified depression type  -     CBC & Differential  -     Comprehensive Metabolic Panel  -     Lipid Panel  -     Vitamin D 25 Hydroxy  -     Vitamin B12  -     TSH  -     Estradiol  -     Folate  -     Luteinizing Hormone    Menopausal symptom  -     CBC & Differential  -     Comprehensive Metabolic Panel  -     Lipid Panel  -     Vitamin D 25 Hydroxy  -     Vitamin B12  -     TSH  -     Estradiol  -     Folate  -     Luteinizing Hormone    Acquired deformity of left hand  -     XR Hand 3+ View Left; Future    Other orders  -     venlafaxine XR (EFFEXOR XR) 75 MG 24 hr capsule; Take 1 capsule by mouth Daily.  -     vilazodone (VIIBRYD) 10 MG tablet tablet; Take 1 tablet by mouth Daily. 1 PO daily x 1 week, then increase to 2 PO daily      Pap UTD  Breast exam done  Labs sent  Wean from Effexor and onto Viibryd, writtent out  Has mamm scheduled  Counseling - diet and exercise  Return in about 6 weeks (around 3/26/2019).

## 2019-02-13 ENCOUNTER — TELEPHONE (OUTPATIENT)
Dept: INTERNAL MEDICINE | Facility: CLINIC | Age: 49
End: 2019-02-13

## 2019-02-13 NOTE — TELEPHONE ENCOUNTER
Insurance has denied the Viibryd and so patient wants to go back to what she was originally taking before.

## 2019-02-13 NOTE — TELEPHONE ENCOUNTER
PT CALLED REQUESTING FOR HER PREVIOUS MEDICATION TO BE SWITCHED BACK TO FULL. SHE DISCUSSED WITH DOTTIE ABOUT SWITCHING MEDS IF INSUR WOULD COVER. INSUR WILL NOT COVER SO SHE WOULD LIKE TO BE PUT BACK ON HER PREVIOUS MEDICATION.

## 2019-02-14 ENCOUNTER — TELEPHONE (OUTPATIENT)
Dept: INTERNAL MEDICINE | Facility: CLINIC | Age: 49
End: 2019-02-14

## 2019-02-14 RX ORDER — DESVENLAFAXINE SUCCINATE 50 MG/1
50 TABLET, EXTENDED RELEASE ORAL DAILY
Qty: 30 TABLET | Refills: 5 | Status: SHIPPED | OUTPATIENT
Start: 2019-02-14 | End: 2019-09-09 | Stop reason: SDUPTHER

## 2019-02-14 RX ORDER — VENLAFAXINE HYDROCHLORIDE 150 MG/1
150 CAPSULE, EXTENDED RELEASE ORAL DAILY
Qty: 30 CAPSULE | Refills: 2 | Status: SHIPPED | OUTPATIENT
Start: 2019-02-14 | End: 2019-03-26

## 2019-02-14 NOTE — TELEPHONE ENCOUNTER
Would she like to try something called Pristiq?  This could be a good fit for her if her insurance will cover.

## 2019-02-14 NOTE — TELEPHONE ENCOUNTER
Yes, she would be willing to try the Pristiq. She will continue with the effexor until we know if this will be covered or not.

## 2019-02-14 NOTE — TELEPHONE ENCOUNTER
PT WOULD LIKE A REFILL OF HER VANLAFAXINE 150MG.    SAID YOU WERE TRYING TO GET IT SWITCHED AROUND BUT INSUR IS NOT LETTING.    SHE SAID WHATEVER MED YOU WANT TO PUT HER ON, KEEP THE SAME OR CHANGE, EITHER WAY IS FINE WITH HER.    SHE LEAVES ON VACATION TOMORROW.

## 2019-02-14 NOTE — TELEPHONE ENCOUNTER
Patient leaves for vacation tomorrow and Pristiq is not covered either. It will take up to 48-72 hours for us to get a decision back on the prior Auth for this. She wants you to prescribe the Effexor 150mg for her to continue with while on vacation and until we can see is we get the Pristiq approved

## 2019-02-14 NOTE — TELEPHONE ENCOUNTER
Called patient and let her know that his has been sent in and that we will work on getting the PA approved while she is on vacation.

## 2019-02-14 NOTE — TELEPHONE ENCOUNTER
I have sent this in.  I recommend alternating the effexor every other day with the pristiq and then just switching to the 50 mg of pristiq.

## 2019-02-14 NOTE — TELEPHONE ENCOUNTER
Left a msg for patient letting her know that we will work on the PA for the Pristiq while she on on vacation, but that Trista has sent in Effexor 150mg for her to take daily for now and to call us if she has any questions,

## 2019-02-25 ENCOUNTER — TELEPHONE (OUTPATIENT)
Dept: INTERNAL MEDICINE | Facility: CLINIC | Age: 49
End: 2019-02-25

## 2019-02-25 NOTE — TELEPHONE ENCOUNTER
----- Message from NEELA Cummins sent at 2/25/2019  1:27 PM EST -----  All labs look great and are within acceptable limits

## 2019-03-04 ENCOUNTER — TELEPHONE (OUTPATIENT)
Dept: INTERNAL MEDICINE | Facility: CLINIC | Age: 49
End: 2019-03-04

## 2019-03-04 NOTE — TELEPHONE ENCOUNTER
Pristiq covered and let her know that Trista said that she  recommends alternating the effexor every other day with the pristiq for about 1 week and then just switching to the 50 mg of pristiq.    Patient verbalized understanding.

## 2019-03-21 ENCOUNTER — HOSPITAL ENCOUNTER (OUTPATIENT)
Dept: GENERAL RADIOLOGY | Facility: HOSPITAL | Age: 49
Discharge: HOME | End: 2019-03-21
Admitting: NURSE PRACTITIONER

## 2019-03-21 DIAGNOSIS — M21.942 ACQUIRED DEFORMITY OF LEFT HAND: ICD-10-CM

## 2019-03-21 PROCEDURE — 73130 X-RAY EXAM OF HAND: CPT

## 2019-03-26 ENCOUNTER — OFFICE VISIT (OUTPATIENT)
Dept: INTERNAL MEDICINE | Facility: CLINIC | Age: 49
End: 2019-03-26

## 2019-03-26 VITALS
DIASTOLIC BLOOD PRESSURE: 90 MMHG | TEMPERATURE: 97.7 F | OXYGEN SATURATION: 98 % | SYSTOLIC BLOOD PRESSURE: 148 MMHG | BODY MASS INDEX: 26.39 KG/M2 | HEART RATE: 108 BPM | HEIGHT: 66 IN | WEIGHT: 164.2 LBS | RESPIRATION RATE: 16 BRPM

## 2019-03-26 DIAGNOSIS — F32.81 PMDD (PREMENSTRUAL DYSPHORIC DISORDER): ICD-10-CM

## 2019-03-26 DIAGNOSIS — M21.942 DEFORMITY OF LEFT HAND: ICD-10-CM

## 2019-03-26 DIAGNOSIS — F32.A DEPRESSION, UNSPECIFIED DEPRESSION TYPE: Primary | ICD-10-CM

## 2019-03-26 DIAGNOSIS — F41.9 ANXIETY: ICD-10-CM

## 2019-03-26 PROCEDURE — 99214 OFFICE O/P EST MOD 30 MIN: CPT | Performed by: NURSE PRACTITIONER

## 2019-03-26 RX ORDER — ACETAMINOPHEN AND CODEINE PHOSPHATE 120; 12 MG/5ML; MG/5ML
SOLUTION ORAL
COMMUNITY
End: 2019-09-24 | Stop reason: ALTCHOICE

## 2019-03-26 NOTE — PROGRESS NOTES
Subjective   Aga Campos is a 48 y.o. female    Chief Complaint   Patient presents with   • 6 week follow up   • Depression     Effexor swtiched to Pristiq. Patient states she is doing well on this.      History of Present Illness     Here for f/u on anxiety/depression    Aga was seen for f/u on 2/12/19 and c/o decreased effectiveness of her Effexor.  Pt was having terrible PMS/PMDD sx's.  She was switched to Viibyrd per request as she had reported success with this in the past, but her insurance would not cover.  I then placed her on Pristiq 50 mg daily.  Returns today after being on this medication x 1 month.  States that she is feeling much better. States that her angry/frustration has greatly decreased.  She has had no SE's and wishes to continue current tx.    Was also sent for xray of the left hand due to deformity.  Pt reports that this has been there for as long as she can remember.  No pain or other associated sx's.      The following portions of the patient's history were reviewed and updated as appropriate: allergies, current medications, past family history, past medical history, past social history, past surgical history and problem list.    Current Outpatient Medications:   •  desvenlafaxine (PRISTIQ) 50 MG 24 hr tablet, Take 1 tablet by mouth Daily., Disp: 30 tablet, Rfl: 5  •  pseudoephedrine-guaifenesin (MUCINEX D)  MG per 12 hr tablet, Take 1 tablet by mouth Every 12 (Twelve) Hours., Disp: 30 tablet, Rfl: 5  •  norethindrone (PRIYANK) 0.35 MG tablet, Priyank 0.35 mg tablet  Take 1 tablet every day by oral route., Disp: , Rfl:      Review of Systems   Constitutional: Negative for chills, fatigue and fever.   Respiratory: Negative for cough, chest tightness and shortness of breath.    Cardiovascular: Negative for chest pain.   Gastrointestinal: Negative for abdominal pain, diarrhea, nausea and vomiting.   Endocrine: Negative for cold intolerance and heat intolerance.   Musculoskeletal:  "Negative for arthralgias.        Left hand deformity   Neurological: Negative for dizziness.       Objective   Physical Exam   Constitutional: She is oriented to person, place, and time. She appears well-developed and well-nourished.   HENT:   Head: Normocephalic and atraumatic.   Eyes: Conjunctivae and EOM are normal. Pupils are equal, round, and reactive to light.   Neck: Normal range of motion.   Cardiovascular: Normal rate, regular rhythm and normal heart sounds.   Pulmonary/Chest: Effort normal and breath sounds normal.   Abdominal: Soft. Bowel sounds are normal.   Musculoskeletal: Normal range of motion.        Left hand: She exhibits deformity and swelling. She exhibits normal range of motion, no tenderness, no bony tenderness, normal two-point discrimination, normal capillary refill and no laceration. Normal sensation noted. Normal strength noted.   Neurological: She is alert and oriented to person, place, and time. She has normal reflexes.   Skin: Skin is warm and dry.   Psychiatric: She has a normal mood and affect. Her behavior is normal. Judgment and thought content normal.     Vitals:    03/26/19 0936   BP: 148/90   Pulse: 108   Resp: 16   Temp: 97.7 °F (36.5 °C)   TempSrc: Temporal   SpO2: 98%   Weight: 74.5 kg (164 lb 3.2 oz)   Height: 167.6 cm (65.98\")         Assessment/Plan   Aga was seen today for 6 week follow up and depression.    Diagnoses and all orders for this visit:    Depression, unspecified depression type    Anxiety    PMDD (premenstrual dysphoric disorder)    Deformity of left hand      Continue Pristiq at 50 mg daily as directed  Xray of left hand reviewed - normal  F/U in 6 months or sooner with any problems           "

## 2019-07-16 ENCOUNTER — OFFICE VISIT (OUTPATIENT)
Dept: INTERNAL MEDICINE | Facility: CLINIC | Age: 49
End: 2019-07-16

## 2019-07-16 VITALS
SYSTOLIC BLOOD PRESSURE: 136 MMHG | TEMPERATURE: 97.8 F | RESPIRATION RATE: 16 BRPM | HEIGHT: 66 IN | HEART RATE: 83 BPM | DIASTOLIC BLOOD PRESSURE: 80 MMHG | BODY MASS INDEX: 22.4 KG/M2 | OXYGEN SATURATION: 99 % | WEIGHT: 139.4 LBS

## 2019-07-16 DIAGNOSIS — L23.9 ALLERGIC DERMATITIS: Primary | ICD-10-CM

## 2019-07-16 DIAGNOSIS — L21.0 SEBORRHEA CAPITIS IN ADULT: ICD-10-CM

## 2019-07-16 PROCEDURE — 96372 THER/PROPH/DIAG INJ SC/IM: CPT | Performed by: NURSE PRACTITIONER

## 2019-07-16 PROCEDURE — 99214 OFFICE O/P EST MOD 30 MIN: CPT | Performed by: NURSE PRACTITIONER

## 2019-07-16 RX ORDER — PREDNISONE 10 MG/1
TABLET ORAL
Qty: 45 TABLET | Refills: 0 | Status: SHIPPED | OUTPATIENT
Start: 2019-07-16 | End: 2019-09-24

## 2019-07-16 RX ORDER — DEXAMETHASONE SODIUM PHOSPHATE 4 MG/ML
8 INJECTION, SOLUTION INTRA-ARTICULAR; INTRALESIONAL; INTRAMUSCULAR; INTRAVENOUS; SOFT TISSUE ONCE
Status: COMPLETED | OUTPATIENT
Start: 2019-07-16 | End: 2019-07-16

## 2019-07-16 RX ORDER — KETOCONAZOLE 20 MG/ML
SHAMPOO TOPICAL 2 TIMES WEEKLY
Qty: 120 ML | Refills: 2 | Status: SHIPPED | OUTPATIENT
Start: 2019-07-18

## 2019-07-16 RX ADMIN — DEXAMETHASONE SODIUM PHOSPHATE 8 MG: 4 INJECTION, SOLUTION INTRA-ARTICULAR; INTRALESIONAL; INTRAMUSCULAR; INTRAVENOUS; SOFT TISSUE at 13:29

## 2019-07-16 NOTE — PROGRESS NOTES
Subjective   Aga Campos is a 48 y.o. female    Chief Complaint   Patient presents with   • Rash     Itchy rash on Upper body. Started on July fourth weekend.      Rash   This is a new problem. Episode onset: 2 weeks ago. The problem has been gradually worsening since onset. The affected locations include the scalp. The rash is characterized by dryness, itchiness and redness. It is unknown if there was an exposure to a precipitant. Pertinent negatives include no anorexia, congestion, cough, diarrhea, eye pain, facial edema, fatigue, fever, joint pain, nail changes, rhinorrhea, shortness of breath, sore throat or vomiting. Past treatments include anti-itch cream. The treatment provided no relief. There is no history of allergies, asthma, eczema or varicella.        The following portions of the patient's history were reviewed and updated as appropriate: allergies, current medications, past family history, past medical history, past social history, past surgical history and problem list.    Current Outpatient Medications:   •  desvenlafaxine (PRISTIQ) 50 MG 24 hr tablet, Take 1 tablet by mouth Daily., Disp: 30 tablet, Rfl: 5  •  ketoconazole (NIZORAL) 2 % shampoo, Apply  topically to the appropriate area as directed 2 (Two) Times a Week., Disp: 120 mL, Rfl: 2  •  norethindrone (PRIYANK) 0.35 MG tablet, Priyank 0.35 mg tablet  Take 1 tablet every day by oral route., Disp: , Rfl:   •  predniSONE (DELTASONE) 10 MG tablet, 5 tabs for 3 days, then 4 tabs for 3 days, then 3 tabs for 3 days, then 2 tabs for 3 days, then 1 tab for 3 days, then stop, Disp: 45 tablet, Rfl: 0  •  pseudoephedrine-guaifenesin (MUCINEX D)  MG per 12 hr tablet, Take 1 tablet by mouth Every 12 (Twelve) Hours., Disp: 30 tablet, Rfl: 5     Review of Systems   Constitutional: Negative for chills, fatigue and fever.   HENT: Negative for congestion, rhinorrhea and sore throat.    Eyes: Negative for pain.   Respiratory: Negative for cough, chest  "tightness and shortness of breath.    Cardiovascular: Negative for chest pain.   Gastrointestinal: Negative for abdominal pain, anorexia, diarrhea, nausea and vomiting.   Endocrine: Negative for cold intolerance and heat intolerance.   Musculoskeletal: Negative for arthralgias and joint pain.   Skin: Positive for rash. Negative for nail changes.   Neurological: Negative for dizziness.       Objective   Physical Exam   Constitutional: She is oriented to person, place, and time. She appears well-developed and well-nourished.   HENT:   Head: Normocephalic and atraumatic.   Eyes: Conjunctivae and EOM are normal. Pupils are equal, round, and reactive to light.   Neck: Normal range of motion.   Cardiovascular: Normal rate, regular rhythm and normal heart sounds.   Pulmonary/Chest: Effort normal and breath sounds normal.   Abdominal: Soft. Bowel sounds are normal.   Musculoskeletal: Normal range of motion.   Neurological: She is alert and oriented to person, place, and time. She has normal reflexes.   Skin: Skin is warm and dry. Rash (diffuse, on upper body) noted. Rash is maculopapular. Urticarial rash: diffuse.        Psychiatric: She has a normal mood and affect. Her behavior is normal. Judgment and thought content normal.     Vitals:    07/16/19 1300   BP: 136/80   Pulse: 83   Resp: 16   Temp: 97.8 °F (36.6 °C)   TempSrc: Temporal   SpO2: 99%   Weight: 63.2 kg (139 lb 6.4 oz)   Height: 167.6 cm (65.98\")         Assessment/Plan   Aga was seen today for rash.    Diagnoses and all orders for this visit:    Allergic dermatitis  -     predniSONE (DELTASONE) 10 MG tablet; 5 tabs for 3 days, then 4 tabs for 3 days, then 3 tabs for 3 days, then 2 tabs for 3 days, then 1 tab for 3 days, then stop  -     dexamethasone (DECADRON) injection 8 mg    Seborrhea capitis in adult  -     predniSONE (DELTASONE) 10 MG tablet; 5 tabs for 3 days, then 4 tabs for 3 days, then 3 tabs for 3 days, then 2 tabs for 3 days, then 1 tab for 3 " days, then stop  -     ketoconazole (NIZORAL) 2 % shampoo; Apply  topically to the appropriate area as directed 2 (Two) Times a Week.      Steroid injection given in office  Steroid taper to start tomorrow.  Ketoconazole shampoo for scalp  Return in about 3 weeks (around 8/6/2019).

## 2019-09-09 RX ORDER — DESVENLAFAXINE SUCCINATE 50 MG/1
TABLET, EXTENDED RELEASE ORAL
Qty: 30 TABLET | Refills: 0 | Status: SHIPPED | OUTPATIENT
Start: 2019-09-09 | End: 2019-09-24 | Stop reason: SDUPTHER

## 2019-09-24 ENCOUNTER — OFFICE VISIT (OUTPATIENT)
Dept: INTERNAL MEDICINE | Facility: CLINIC | Age: 49
End: 2019-09-24

## 2019-09-24 VITALS
HEART RATE: 104 BPM | WEIGHT: 145.4 LBS | SYSTOLIC BLOOD PRESSURE: 118 MMHG | RESPIRATION RATE: 16 BRPM | TEMPERATURE: 97.6 F | BODY MASS INDEX: 23.37 KG/M2 | OXYGEN SATURATION: 98 % | DIASTOLIC BLOOD PRESSURE: 72 MMHG | HEIGHT: 66 IN

## 2019-09-24 DIAGNOSIS — Z23 NEED FOR INFLUENZA VACCINATION: ICD-10-CM

## 2019-09-24 DIAGNOSIS — F32.81 PMDD (PREMENSTRUAL DYSPHORIC DISORDER): ICD-10-CM

## 2019-09-24 DIAGNOSIS — Z12.39 SCREENING FOR BREAST CANCER: ICD-10-CM

## 2019-09-24 DIAGNOSIS — F33.0 MILD EPISODE OF RECURRENT MAJOR DEPRESSIVE DISORDER (HCC): ICD-10-CM

## 2019-09-24 DIAGNOSIS — F41.9 ANXIETY: Primary | ICD-10-CM

## 2019-09-24 PROCEDURE — 99213 OFFICE O/P EST LOW 20 MIN: CPT | Performed by: NURSE PRACTITIONER

## 2019-09-24 PROCEDURE — 90471 IMMUNIZATION ADMIN: CPT | Performed by: NURSE PRACTITIONER

## 2019-09-24 PROCEDURE — 90674 CCIIV4 VAC NO PRSV 0.5 ML IM: CPT | Performed by: NURSE PRACTITIONER

## 2019-09-24 RX ORDER — DESVENLAFAXINE SUCCINATE 50 MG/1
50 TABLET, EXTENDED RELEASE ORAL DAILY
Qty: 90 TABLET | Refills: 1 | Status: SHIPPED | OUTPATIENT
Start: 2019-09-24

## 2019-09-24 RX ORDER — ESTRADIOL 0.05 MG/D
PATCH TRANSDERMAL
COMMUNITY

## 2019-09-24 NOTE — PROGRESS NOTES
Subjective   Aga Campos is a 49 y.o. female    Chief Complaint   Patient presents with   • 6 month follow up   • Anxiety and depression   • PMDD     History of Present Illness     Here for 6 month f/u     Anxiety/depression/PMDD - chronic and stable on Pristiq 50 mg daily.  States that this has greatly decreased her sx's and her mood.  She denies any medication SE's.  Needs refilled today.      Flu shot - will update today  Tdap - 2015  Hep A - declines  Pap - 2017  Mamm - -2/14/18    The following portions of the patient's history were reviewed and updated as appropriate: allergies, current medications, past family history, past medical history, past social history, past surgical history and problem list.    Current Outpatient Medications:   •  desvenlafaxine (PRISTIQ) 50 MG 24 hr tablet, Take 1 tablet by mouth Daily., Disp: 90 tablet, Rfl: 1  •  estradiol (CLIMARA) 0.05 MG/24HR patch, estradiol 0.05 mg/24 hr weekly transdermal patch  Use 1 patch topically twice a week, Disp: , Rfl:   •  ketoconazole (NIZORAL) 2 % shampoo, Apply  topically to the appropriate area as directed 2 (Two) Times a Week., Disp: 120 mL, Rfl: 2  •  progesterone (PROMETRIUM) 200 MG capsule, Prometrium 200 mg capsule  Take 1 capsule every day by oral route., Disp: , Rfl:   •  pseudoephedrine-guaifenesin (MUCINEX D)  MG per 12 hr tablet, Take 1 tablet by mouth Every 12 (Twelve) Hours., Disp: 30 tablet, Rfl: 5     Review of Systems   Constitutional: Negative for chills, fatigue and fever.   Respiratory: Negative for cough, chest tightness and shortness of breath.    Cardiovascular: Negative for chest pain.   Gastrointestinal: Negative for abdominal pain, diarrhea, nausea and vomiting.   Endocrine: Negative for cold intolerance and heat intolerance.   Musculoskeletal: Negative for arthralgias.   Neurological: Negative for dizziness.       Objective   Physical Exam   Constitutional: She is oriented to person, place, and time. She  "appears well-developed and well-nourished.   HENT:   Head: Normocephalic and atraumatic.   Eyes: Conjunctivae and EOM are normal. Pupils are equal, round, and reactive to light.   Neck: Normal range of motion.   Cardiovascular: Normal rate, regular rhythm and normal heart sounds.   Pulmonary/Chest: Effort normal and breath sounds normal.   Abdominal: Soft. Bowel sounds are normal.   Musculoskeletal: Normal range of motion.   Neurological: She is alert and oriented to person, place, and time. She has normal reflexes.   Skin: Skin is warm and dry.   Psychiatric: She has a normal mood and affect. Her behavior is normal. Judgment and thought content normal.     Vitals:    09/24/19 0944   BP: 118/72   Pulse: 104   Resp: 16   Temp: 97.6 °F (36.4 °C)   TempSrc: Temporal   SpO2: 98%   Weight: 66 kg (145 lb 6.4 oz)   Height: 167.6 cm (65.98\")         Assessment/Plan   Aga was seen today for 6 month follow up, anxiety and depression and pmdd.    Diagnoses and all orders for this visit:    Anxiety  -     desvenlafaxine (PRISTIQ) 50 MG 24 hr tablet; Take 1 tablet by mouth Daily.    Mild episode of recurrent major depressive disorder (CMS/HCC)  -     desvenlafaxine (PRISTIQ) 50 MG 24 hr tablet; Take 1 tablet by mouth Daily.    PMDD (premenstrual dysphoric disorder)  -     desvenlafaxine (PRISTIQ) 50 MG 24 hr tablet; Take 1 tablet by mouth Daily.    Screening for breast cancer  -     Mammo Screening Digital Tomosynthesis Bilateral With CAD; Future    Need for influenza vaccination  -     Flucelvax Quad=>4Years ([id number])    Pristiq refilled at current dose w/o change  Flu shot updated  Mammogram ordered  Return in about 6 months (around 3/24/2020) for Annual.               "

## 2019-12-05 ENCOUNTER — HOSPITAL ENCOUNTER (OUTPATIENT)
Dept: MAMMOGRAPHY | Facility: HOSPITAL | Age: 49
Discharge: HOME | End: 2019-12-05
Admitting: NURSE PRACTITIONER

## 2019-12-05 DIAGNOSIS — Z12.39 SCREENING FOR BREAST CANCER: ICD-10-CM

## 2019-12-05 PROCEDURE — 77067 SCR MAMMO BI INCL CAD: CPT | Performed by: RADIOLOGY

## 2019-12-05 PROCEDURE — 77063 BREAST TOMOSYNTHESIS BI: CPT | Performed by: RADIOLOGY

## 2019-12-05 PROCEDURE — 77063 BREAST TOMOSYNTHESIS BI: CPT

## 2019-12-05 PROCEDURE — 77067 SCR MAMMO BI INCL CAD: CPT
